# Patient Record
Sex: MALE | Race: WHITE | NOT HISPANIC OR LATINO | URBAN - NONMETROPOLITAN AREA
[De-identification: names, ages, dates, MRNs, and addresses within clinical notes are randomized per-mention and may not be internally consistent; named-entity substitution may affect disease eponyms.]

---

## 2021-09-30 ENCOUNTER — OFFICE (OUTPATIENT)
Dept: URBAN - NONMETROPOLITAN AREA CLINIC 5 | Facility: CLINIC | Age: 81
End: 2021-09-30

## 2021-09-30 VITALS
SYSTOLIC BLOOD PRESSURE: 129 MMHG | HEART RATE: 95 BPM | DIASTOLIC BLOOD PRESSURE: 77 MMHG | RESPIRATION RATE: 18 BRPM | HEIGHT: 76 IN | WEIGHT: 223 LBS

## 2021-09-30 DIAGNOSIS — R19.4 CHANGE IN BOWEL HABIT: ICD-10-CM

## 2021-09-30 DIAGNOSIS — R14.0 ABDOMINAL DISTENSION (GASEOUS): ICD-10-CM

## 2021-09-30 DIAGNOSIS — Z86.010 PERSONAL HISTORY OF COLONIC POLYPS: ICD-10-CM

## 2021-09-30 PROCEDURE — 99204 OFFICE O/P NEW MOD 45 MIN: CPT | Performed by: INTERNAL MEDICINE

## 2021-10-13 ENCOUNTER — AMBULATORY SURGICAL CENTER (OUTPATIENT)
Dept: URBAN - NONMETROPOLITAN AREA SURGERY 4 | Facility: SURGERY | Age: 81
End: 2021-10-13
Payer: COMMERCIAL

## 2021-10-13 ENCOUNTER — OFFICE (OUTPATIENT)
Dept: URBAN - METROPOLITAN AREA PATHOLOGY 29 | Facility: PATHOLOGY | Age: 81
End: 2021-10-13

## 2021-10-13 ENCOUNTER — AMBULATORY SURGICAL CENTER (OUTPATIENT)
Dept: URBAN - NONMETROPOLITAN AREA SURGERY 4 | Facility: SURGERY | Age: 81
End: 2021-10-13
Payer: MEDICARE

## 2021-10-13 VITALS
DIASTOLIC BLOOD PRESSURE: 49 MMHG | HEART RATE: 71 BPM | DIASTOLIC BLOOD PRESSURE: 49 MMHG | RESPIRATION RATE: 13 BRPM | DIASTOLIC BLOOD PRESSURE: 60 MMHG | TEMPERATURE: 98 F | HEART RATE: 65 BPM | SYSTOLIC BLOOD PRESSURE: 103 MMHG | HEART RATE: 78 BPM | OXYGEN SATURATION: 93 % | OXYGEN SATURATION: 94 % | HEART RATE: 71 BPM | SYSTOLIC BLOOD PRESSURE: 93 MMHG | RESPIRATION RATE: 18 BRPM | RESPIRATION RATE: 15 BRPM | DIASTOLIC BLOOD PRESSURE: 71 MMHG | SYSTOLIC BLOOD PRESSURE: 101 MMHG | RESPIRATION RATE: 12 BRPM | OXYGEN SATURATION: 93 % | RESPIRATION RATE: 17 BRPM | HEART RATE: 64 BPM | HEART RATE: 62 BPM | DIASTOLIC BLOOD PRESSURE: 60 MMHG | TEMPERATURE: 98.2 F | HEART RATE: 64 BPM | SYSTOLIC BLOOD PRESSURE: 121 MMHG | HEART RATE: 75 BPM | DIASTOLIC BLOOD PRESSURE: 54 MMHG | HEART RATE: 78 BPM | HEART RATE: 70 BPM | SYSTOLIC BLOOD PRESSURE: 84 MMHG | TEMPERATURE: 97.5 F | HEART RATE: 62 BPM | TEMPERATURE: 97.5 F | DIASTOLIC BLOOD PRESSURE: 58 MMHG | HEIGHT: 76 IN | DIASTOLIC BLOOD PRESSURE: 64 MMHG | OXYGEN SATURATION: 94 % | OXYGEN SATURATION: 95 % | OXYGEN SATURATION: 94 % | DIASTOLIC BLOOD PRESSURE: 54 MMHG | RESPIRATION RATE: 13 BRPM | SYSTOLIC BLOOD PRESSURE: 84 MMHG | HEART RATE: 69 BPM | RESPIRATION RATE: 17 BRPM | HEART RATE: 74 BPM | WEIGHT: 217 LBS | DIASTOLIC BLOOD PRESSURE: 67 MMHG | HEART RATE: 69 BPM | SYSTOLIC BLOOD PRESSURE: 93 MMHG | SYSTOLIC BLOOD PRESSURE: 93 MMHG | SYSTOLIC BLOOD PRESSURE: 95 MMHG | OXYGEN SATURATION: 90 % | SYSTOLIC BLOOD PRESSURE: 121 MMHG | DIASTOLIC BLOOD PRESSURE: 54 MMHG | DIASTOLIC BLOOD PRESSURE: 55 MMHG | HEIGHT: 76 IN | TEMPERATURE: 98.2 F | OXYGEN SATURATION: 90 % | RESPIRATION RATE: 17 BRPM | SYSTOLIC BLOOD PRESSURE: 101 MMHG | HEART RATE: 71 BPM | DIASTOLIC BLOOD PRESSURE: 60 MMHG | HEART RATE: 62 BPM | HEART RATE: 74 BPM | SYSTOLIC BLOOD PRESSURE: 101 MMHG | TEMPERATURE: 98 F | HEART RATE: 69 BPM | OXYGEN SATURATION: 93 % | RESPIRATION RATE: 12 BRPM | SYSTOLIC BLOOD PRESSURE: 125 MMHG | RESPIRATION RATE: 18 BRPM | OXYGEN SATURATION: 90 % | SYSTOLIC BLOOD PRESSURE: 125 MMHG | DIASTOLIC BLOOD PRESSURE: 48 MMHG | TEMPERATURE: 98 F | HEART RATE: 75 BPM | DIASTOLIC BLOOD PRESSURE: 71 MMHG | DIASTOLIC BLOOD PRESSURE: 64 MMHG | SYSTOLIC BLOOD PRESSURE: 82 MMHG | HEART RATE: 78 BPM | DIASTOLIC BLOOD PRESSURE: 67 MMHG | RESPIRATION RATE: 15 BRPM | DIASTOLIC BLOOD PRESSURE: 58 MMHG | SYSTOLIC BLOOD PRESSURE: 121 MMHG | DIASTOLIC BLOOD PRESSURE: 49 MMHG | HEART RATE: 75 BPM | SYSTOLIC BLOOD PRESSURE: 95 MMHG | SYSTOLIC BLOOD PRESSURE: 103 MMHG | OXYGEN SATURATION: 97 % | RESPIRATION RATE: 18 BRPM | WEIGHT: 217 LBS | OXYGEN SATURATION: 97 % | OXYGEN SATURATION: 95 % | DIASTOLIC BLOOD PRESSURE: 55 MMHG | DIASTOLIC BLOOD PRESSURE: 67 MMHG | DIASTOLIC BLOOD PRESSURE: 48 MMHG | HEART RATE: 70 BPM | DIASTOLIC BLOOD PRESSURE: 48 MMHG | HEART RATE: 70 BPM | RESPIRATION RATE: 14 BRPM | DIASTOLIC BLOOD PRESSURE: 58 MMHG | HEART RATE: 65 BPM | HEART RATE: 64 BPM | OXYGEN SATURATION: 95 % | HEIGHT: 76 IN | DIASTOLIC BLOOD PRESSURE: 64 MMHG | OXYGEN SATURATION: 96 % | DIASTOLIC BLOOD PRESSURE: 55 MMHG | TEMPERATURE: 97.5 F | OXYGEN SATURATION: 96 % | OXYGEN SATURATION: 96 % | SYSTOLIC BLOOD PRESSURE: 84 MMHG | HEART RATE: 65 BPM | RESPIRATION RATE: 14 BRPM | HEART RATE: 74 BPM | RESPIRATION RATE: 13 BRPM | TEMPERATURE: 98.2 F | WEIGHT: 217 LBS | RESPIRATION RATE: 12 BRPM | SYSTOLIC BLOOD PRESSURE: 95 MMHG | RESPIRATION RATE: 14 BRPM | SYSTOLIC BLOOD PRESSURE: 125 MMHG | SYSTOLIC BLOOD PRESSURE: 82 MMHG | OXYGEN SATURATION: 97 % | RESPIRATION RATE: 15 BRPM | SYSTOLIC BLOOD PRESSURE: 103 MMHG | DIASTOLIC BLOOD PRESSURE: 71 MMHG | SYSTOLIC BLOOD PRESSURE: 82 MMHG

## 2021-10-13 DIAGNOSIS — Z86.010 PERSONAL HISTORY OF COLONIC POLYPS: ICD-10-CM

## 2021-10-13 DIAGNOSIS — R14.0 ABDOMINAL DISTENSION (GASEOUS): ICD-10-CM

## 2021-10-13 DIAGNOSIS — K63.5 POLYP OF COLON: ICD-10-CM

## 2021-10-13 DIAGNOSIS — K57.30 DIVERTICULOSIS OF LARGE INTESTINE WITHOUT PERFORATION OR ABS: ICD-10-CM

## 2021-10-13 PROCEDURE — G8907 PT DOC NO EVENTS ON DISCHARG: HCPCS | Performed by: INTERNAL MEDICINE

## 2021-10-13 PROCEDURE — 45384 COLONOSCOPY W/LESION REMOVAL: CPT | Mod: GA | Performed by: INTERNAL MEDICINE

## 2021-10-13 PROCEDURE — 00811 ANES LWR INTST NDSC NOS: CPT | Mod: QZ | Performed by: NURSE ANESTHETIST, CERTIFIED REGISTERED

## 2021-10-13 PROCEDURE — 88305 TISSUE EXAM BY PATHOLOGIST: CPT | Performed by: PATHOLOGY

## 2021-12-14 ENCOUNTER — OFFICE (OUTPATIENT)
Dept: URBAN - NONMETROPOLITAN AREA CLINIC 5 | Facility: CLINIC | Age: 81
End: 2021-12-14

## 2021-12-14 VITALS
DIASTOLIC BLOOD PRESSURE: 74 MMHG | HEART RATE: 73 BPM | HEIGHT: 76 IN | SYSTOLIC BLOOD PRESSURE: 106 MMHG | WEIGHT: 229 LBS | RESPIRATION RATE: 20 BRPM

## 2021-12-14 DIAGNOSIS — D62 ACUTE POSTHEMORRHAGIC ANEMIA: ICD-10-CM

## 2021-12-14 DIAGNOSIS — K57.30 DIVERTICULOSIS OF LARGE INTESTINE WITHOUT PERFORATION OR ABS: ICD-10-CM

## 2021-12-14 DIAGNOSIS — R10.32 LEFT LOWER QUADRANT PAIN: ICD-10-CM

## 2021-12-14 DIAGNOSIS — K58.2 MIXED IRRITABLE BOWEL SYNDROME: ICD-10-CM

## 2021-12-14 DIAGNOSIS — Z79.02 LONG TERM (CURRENT) USE OF ANTITHROMBOTICS/ANTIPLATELETS: ICD-10-CM

## 2021-12-14 PROCEDURE — 99213 OFFICE O/P EST LOW 20 MIN: CPT | Performed by: INTERNAL MEDICINE

## 2022-04-19 ENCOUNTER — OFFICE (OUTPATIENT)
Dept: URBAN - NONMETROPOLITAN AREA CLINIC 5 | Facility: CLINIC | Age: 82
End: 2022-04-19

## 2022-04-19 VITALS
SYSTOLIC BLOOD PRESSURE: 142 MMHG | DIASTOLIC BLOOD PRESSURE: 79 MMHG | WEIGHT: 240 LBS | RESPIRATION RATE: 18 BRPM | HEIGHT: 76 IN | HEART RATE: 73 BPM

## 2022-04-19 DIAGNOSIS — R10.32 LEFT LOWER QUADRANT PAIN: ICD-10-CM

## 2022-04-19 DIAGNOSIS — K57.30 DIVERTICULOSIS OF LARGE INTESTINE WITHOUT PERFORATION OR ABS: ICD-10-CM

## 2022-04-19 DIAGNOSIS — K21.9 GASTRO-ESOPHAGEAL REFLUX DISEASE WITHOUT ESOPHAGITIS: ICD-10-CM

## 2022-04-19 PROCEDURE — 99214 OFFICE O/P EST MOD 30 MIN: CPT | Performed by: INTERNAL MEDICINE

## 2022-04-20 PROBLEM — K57.30: Status: ACTIVE | Noted: 2021-10-13

## 2022-08-31 ENCOUNTER — OFFICE (OUTPATIENT)
Dept: URBAN - NONMETROPOLITAN AREA CLINIC 5 | Facility: CLINIC | Age: 82
End: 2022-08-31

## 2022-08-31 VITALS
WEIGHT: 241 LBS | HEART RATE: 68 BPM | SYSTOLIC BLOOD PRESSURE: 144 MMHG | RESPIRATION RATE: 18 BRPM | HEIGHT: 76 IN | DIASTOLIC BLOOD PRESSURE: 85 MMHG

## 2022-08-31 DIAGNOSIS — K57.30 DIVERTICULOSIS OF LARGE INTESTINE WITHOUT PERFORATION OR ABS: ICD-10-CM

## 2022-08-31 DIAGNOSIS — K58.2 MIXED IRRITABLE BOWEL SYNDROME: ICD-10-CM

## 2022-08-31 DIAGNOSIS — K21.9 GASTRO-ESOPHAGEAL REFLUX DISEASE WITHOUT ESOPHAGITIS: ICD-10-CM

## 2022-08-31 PROCEDURE — 99214 OFFICE O/P EST MOD 30 MIN: CPT | Performed by: INTERNAL MEDICINE

## 2022-08-31 NOTE — SERVICENOTES
-  he is really doing better than he has in quite some time.  I would encourage him to switch Bentyl to the morning time so that it actually has the ability to work on some of his digestive issues.  He is not getting much benefit from it during night.  We have refilled his medications and he is to continue on fiber as currently underway.  He may wish to reduce one of the stool studies back to see if that will cut down on some of the looseness.
-   Acid reflux is generally doing well.  Energy sounds like may just be related to the fact that he is now 82.  It is not true fatigue and he wakes up feeling fairly well and energetic and is still doing a surprising amount of work given his age.  Will monitor for now
-  RTC in one year so long as he is doing well

## 2022-08-31 NOTE — SERVICEHPINOTES
Kenneth Devi   is a   82   year old  male   who is here today for routine follow up.   He was here for surveillance colonoscopy in late October 2021, where he had a small polyp adjacent to the appendiceal orifice that was removed as well as another in the proximal ascending colon on the edge of a fold.  Diverticulosis was also present in the sigmoid colon.  At the time of that procedure he had mentioned worsening issues with bloating and constipation with occasional lower abdominal cramps.  I had him begin Metamucil therapy with two capsules at bedtime.  He was also to begin Bentyl 20 mg twice daily before meals.  Unfortunately a week out from his colonoscopy he was vacationing with his wife up in [place], [place],  when he developed sudden urgency and burgundy black bloody stools.  This happened 3 times over the course of the day and he  was feeling very weak and so he presented to the hospital in [place], [place],  where he was admitted for several days and given blood.  He remembers getting a bit constipated that morning and had not yet started on his fiber or Bentyl.  He had also just started back his Plavix the night before.  He was already on it for questionable reasons and it was held for a week after the procedures since he had a few small polyps removed.  It sounds like while he was in the hospital he had another look but did not require any Endo therapy.  There was a small ulceration at the polypectomy site in the right colonIn follow-up today he is generally doing fairly well.  His  bowels move basically every day to every other day and without near the difficulty that it used to be.  He takes one Metamucil in the morning and two at night along with two Colace in the morning.  He takes Bentyl just once daily but it is at night.  His stools are not hard anymore, and if anything they may be a bit loose.  He still has a bit of a heaviness or awareness of his stomach as if he were mildly bloated but generally is much better and is pleased with how things are coming along
br
brAcid reflux is staying under control so long as he continues daily Pepcid in the morning and again at bedtime.    Energy is still a big challenge.  Most days he Is up and about doing various chores and activity that require a good bit of exertion for his age but then gets tired and he takes a nap every day after lunch for about 2 hours  It is getting some better but he has gained about 16 lb of weight from eating too many snacks while doing nothing from an activity standpoint.  He does realize he is got to get the weight off, start moving, and start eating better..

## 2023-04-27 ENCOUNTER — OFFICE (OUTPATIENT)
Dept: URBAN - NONMETROPOLITAN AREA CLINIC 5 | Facility: CLINIC | Age: 83
End: 2023-04-27